# Patient Record
Sex: MALE | Race: WHITE | NOT HISPANIC OR LATINO | Employment: OTHER | URBAN - METROPOLITAN AREA
[De-identification: names, ages, dates, MRNs, and addresses within clinical notes are randomized per-mention and may not be internally consistent; named-entity substitution may affect disease eponyms.]

---

## 2017-02-20 ENCOUNTER — PATIENT OUTREACH (OUTPATIENT)
Dept: ADMINISTRATIVE | Facility: HOSPITAL | Age: 82
End: 2017-02-20

## 2017-02-20 DIAGNOSIS — N40.0 BENIGN NON-NODULAR PROSTATIC HYPERPLASIA, PRESENCE OF LOWER URINARY TRACT SYMPTOMS UNSPECIFIED: Primary | ICD-10-CM

## 2017-02-20 DIAGNOSIS — Z12.5 PROSTATE CANCER SCREENING: ICD-10-CM

## 2017-03-01 ENCOUNTER — LAB VISIT (OUTPATIENT)
Dept: LAB | Facility: HOSPITAL | Age: 82
End: 2017-03-01
Attending: INTERNAL MEDICINE
Payer: MEDICARE

## 2017-03-01 DIAGNOSIS — Z12.5 PROSTATE CANCER SCREENING: ICD-10-CM

## 2017-03-01 DIAGNOSIS — N40.0 BENIGN NON-NODULAR PROSTATIC HYPERPLASIA, PRESENCE OF LOWER URINARY TRACT SYMPTOMS UNSPECIFIED: ICD-10-CM

## 2017-03-01 DIAGNOSIS — I25.810 CORONARY ARTERY DISEASE INVOLVING CORONARY BYPASS GRAFT OF NATIVE HEART WITHOUT ANGINA PECTORIS: ICD-10-CM

## 2017-03-01 LAB
ALBUMIN SERPL BCP-MCNC: 3.7 G/DL
ALP SERPL-CCNC: 73 U/L
ALT SERPL W/O P-5'-P-CCNC: 37 U/L
ANION GAP SERPL CALC-SCNC: 11 MMOL/L
AST SERPL-CCNC: 26 U/L
BASOPHILS # BLD AUTO: 0.01 K/UL
BASOPHILS NFR BLD: 0.2 %
BILIRUB SERPL-MCNC: 0.5 MG/DL
BUN SERPL-MCNC: 23 MG/DL
CALCIUM SERPL-MCNC: 9.5 MG/DL
CHLORIDE SERPL-SCNC: 106 MMOL/L
CHOLEST/HDLC SERPL: 4.5 {RATIO}
CO2 SERPL-SCNC: 21 MMOL/L
COMPLEXED PSA SERPL-MCNC: 0.03 NG/ML
CREAT SERPL-MCNC: 1.1 MG/DL
DIFFERENTIAL METHOD: NORMAL
EOSINOPHIL # BLD AUTO: 0.2 K/UL
EOSINOPHIL NFR BLD: 3.5 %
ERYTHROCYTE [DISTWIDTH] IN BLOOD BY AUTOMATED COUNT: 12.2 %
EST. GFR  (AFRICAN AMERICAN): >60 ML/MIN/1.73 M^2
EST. GFR  (NON AFRICAN AMERICAN): >60 ML/MIN/1.73 M^2
GLUCOSE SERPL-MCNC: 103 MG/DL
HCT VFR BLD AUTO: 42.2 %
HDL/CHOLESTEROL RATIO: 22.2 %
HDLC SERPL-MCNC: 203 MG/DL
HDLC SERPL-MCNC: 45 MG/DL
HGB BLD-MCNC: 14.5 G/DL
LDLC SERPL CALC-MCNC: 105.6 MG/DL
LYMPHOCYTES # BLD AUTO: 1.3 K/UL
LYMPHOCYTES NFR BLD: 31.7 %
MCH RBC QN AUTO: 30.5 PG
MCHC RBC AUTO-ENTMCNC: 34.4 %
MCV RBC AUTO: 89 FL
MONOCYTES # BLD AUTO: 0.5 K/UL
MONOCYTES NFR BLD: 10.9 %
NEUTROPHILS # BLD AUTO: 2.3 K/UL
NEUTROPHILS NFR BLD: 53.5 %
NONHDLC SERPL-MCNC: 158 MG/DL
PLATELET # BLD AUTO: 195 K/UL
PMV BLD AUTO: 11.6 FL
POTASSIUM SERPL-SCNC: 4.2 MMOL/L
PROT SERPL-MCNC: 7.3 G/DL
RBC # BLD AUTO: 4.75 M/UL
SODIUM SERPL-SCNC: 138 MMOL/L
TRIGL SERPL-MCNC: 262 MG/DL
WBC # BLD AUTO: 4.23 K/UL

## 2017-03-01 PROCEDURE — 80053 COMPREHEN METABOLIC PANEL: CPT

## 2017-03-01 PROCEDURE — 84153 ASSAY OF PSA TOTAL: CPT

## 2017-03-01 PROCEDURE — 80061 LIPID PANEL: CPT

## 2017-03-01 PROCEDURE — 85025 COMPLETE CBC W/AUTO DIFF WBC: CPT

## 2017-03-01 PROCEDURE — 36415 COLL VENOUS BLD VENIPUNCTURE: CPT | Mod: PO

## 2017-03-02 ENCOUNTER — OFFICE VISIT (OUTPATIENT)
Dept: INTERNAL MEDICINE | Facility: CLINIC | Age: 82
End: 2017-03-02
Payer: MEDICARE

## 2017-03-02 VITALS
DIASTOLIC BLOOD PRESSURE: 60 MMHG | HEART RATE: 74 BPM | TEMPERATURE: 98 F | BODY MASS INDEX: 28.22 KG/M2 | WEIGHT: 208.31 LBS | HEIGHT: 72 IN | SYSTOLIC BLOOD PRESSURE: 122 MMHG

## 2017-03-02 DIAGNOSIS — I10 ESSENTIAL HYPERTENSION: ICD-10-CM

## 2017-03-02 DIAGNOSIS — E29.1 HYPOGONADISM MALE: ICD-10-CM

## 2017-03-02 DIAGNOSIS — I25.810 CORONARY ARTERY DISEASE INVOLVING CORONARY BYPASS GRAFT OF NATIVE HEART WITHOUT ANGINA PECTORIS: ICD-10-CM

## 2017-03-02 DIAGNOSIS — E78.2 MIXED HYPERLIPIDEMIA: ICD-10-CM

## 2017-03-02 PROCEDURE — 99214 OFFICE O/P EST MOD 30 MIN: CPT | Mod: S$PBB,,, | Performed by: INTERNAL MEDICINE

## 2017-03-02 PROCEDURE — 99214 OFFICE O/P EST MOD 30 MIN: CPT | Mod: PBBFAC,PO | Performed by: INTERNAL MEDICINE

## 2017-03-02 PROCEDURE — 99999 PR PBB SHADOW E&M-EST. PATIENT-LVL IV: CPT | Mod: PBBFAC,,, | Performed by: INTERNAL MEDICINE

## 2017-03-02 RX ORDER — TESTOSTERONE CYPIONATE 200 MG/ML
200 INJECTION, SOLUTION INTRAMUSCULAR
Status: DISCONTINUED | OUTPATIENT
Start: 2017-03-02 | End: 2017-03-02

## 2017-03-02 RX ORDER — TESTOSTERONE CYPIONATE 200 MG/ML
200 INJECTION, SOLUTION INTRAMUSCULAR
Qty: 10 ML | Refills: 0 | Status: SHIPPED | OUTPATIENT
Start: 2017-03-02 | End: 2017-06-07

## 2017-03-02 RX ORDER — SYRINGE, DISPOSABLE, 3 ML
1 SYRINGE, EMPTY DISPOSABLE MISCELLANEOUS
COMMUNITY
End: 2017-03-02 | Stop reason: SDUPTHER

## 2017-03-02 RX ORDER — ASPIRIN 81 MG/1
81 TABLET ORAL DAILY
Refills: 0 | COMMUNITY
Start: 2017-03-02

## 2017-03-02 RX ORDER — TESTOSTERONE CYPIONATE 200 MG/ML
200 INJECTION, SOLUTION INTRAMUSCULAR
Qty: 10 ML | Refills: 0 | Status: SHIPPED | OUTPATIENT
Start: 2017-03-02 | End: 2017-03-02 | Stop reason: SDUPTHER

## 2017-03-02 RX ORDER — SYRINGE, DISPOSABLE, 3 ML
1 SYRINGE, EMPTY DISPOSABLE MISCELLANEOUS
Qty: 25 EACH | Refills: 11 | Status: SHIPPED | OUTPATIENT
Start: 2017-03-02

## 2017-03-02 NOTE — ASSESSMENT & PLAN NOTE
Patient having decreased libido and energy. Has known low testosterone.  Will resume replacement, depotestosterone 200mg once monthly.  Will have to monitor cbc as patient developed polycythemia previously.

## 2017-03-02 NOTE — ASSESSMENT & PLAN NOTE
Cholesterol numbers look good.  We will continue the current regimen at this time.  Remain focused on low fat diet and high dietary fiber intake.

## 2017-03-02 NOTE — MR AVS SNAPSHOT
[facility]  [address] [place] [address]  Phone: [phone]  Fax: [phone]                  Aditya Chua   3/2/2017 9:20 AM   Office Visit    Description:  Male : 1935   Provider:  Fran Walton MD   [facility]  [facility]           Reason for Visit     Follow-up           Diagnoses this Visit        Comments    Coronary artery disease involving coronary bypass graft of native heart without angina pectoris         Essential hypertension         Mixed hyperlipidemia         Hypogonadism male                To Do List           Future Appointments        Provider Department Dept Phone    2017 8:30 AM LABORATORY, [facility] [phone]    2017 8:40 AM Fran Walton MD [facility] [phone]      Goals (5 Years of Data)     None      Follow-Up and Disposition     Return in about 3 months (around 2017).       These Medications        Disp Refills Start End    testosterone cypionate (DEPOTESTOTERONE CYPIONATE) 200 mg/mL injection 10 mL 0 3/2/2017 2017    Inject 1 mL (200 mg total) into the muscle every 28 days. - Intramuscular    Pharmacy: [facility]/pharmacy #[id number] - [place] - [address] AT [facility] #: [phone]         [facility]     [facility] -  Assistance  Registered nurses in the [facility] provide clinical advisement, health education, appointment booking, and other advisory services.  Call for this free service at [phone].             Medications           Message regarding Medications     Verify the changes and/or additions to your medication regime listed below are the same as discussed with your clinician today.  If any of these changes or additions are incorrect, please notify your healthcare provider.        START taking these NEW medications        Refills    testosterone  cypionate (DEPOTESTOTERONE CYPIONATE) 200 mg/mL injection 0    Sig: Inject 1 mL (200 mg total) into the muscle every 28 days.    Class: Print    Route: Intramuscular           Verify that the below list of medications is an accurate representation of the medications you are currently taking.  If none reported, the list may be blank. If incorrect, please contact your healthcare provider. Carry this list with you in case of emergency.           Current Medications     gabapentin (NEURONTIN) 300 MG capsule TAKE 1 CAPSULE BY MOUTH AT BEDTIME FOR 1 WEEK, THEN 2 TABLETS AT BEDTIME IF TOLERATED    losartan (COZAAR) 25 MG tablet Take 1 tablet (25 mg total) by mouth once daily.    naproxen (NAPROSYN) 500 MG tablet Take 1 tablet (500 mg total) by mouth 2 (two) times daily with meals.    omeprazole (PRILOSEC) 20 MG capsule TAKE ONE CAPSULE BY MOUTH EVERY DAY    simvastatin (ZOCOR) 40 MG tablet TAKE 2 TABLEST BY MOUTH ON MONDAY, WEDNESDAY, AND FRIDAY, THEN 1 TABLET ON THE OTHER DAYS    tamsulosin (FLOMAX) 0.4 mg Cp24 TAKE 1 CAPSULE EVERY DAY    tobramycin-dexamethasone 0.3-0.1% (TOBRADEX) 0.3-0.1 % DrpS     triamterene-hydrochlorothiazide 37.5-25 mg (MAXZIDE-25) 37.5-25 mg per tablet TAKE 1 TABLET BY MOUTH EVERY DAY    aspirin (ECOTRIN) 81 MG EC tablet Take 1 tablet (81 mg total) by mouth once daily.    LEVITRA 20 mg tablet TAKE 1 TABLET BY MOUTH 20MINUTES BEFORE SEXUAL ACTIVITY    peg-electrolyte soln 420 gram SolR     PROCTOSOL HC 2.5 % rectal cream     testosterone cypionate (DEPOTESTOTERONE CYPIONATE) 200 mg/mL injection Inject 1 mL (200 mg total) into the muscle every 28 days.           Clinical Reference Information           Your Vitals Were     BP                   122/60           Blood Pressure          Most Recent Value    BP  122/60      Allergies as of 3/2/2017     Ace Inhibitors      Immunizations Administered on Date of Encounter - 3/2/2017     None      Orders Placed During Today's Visit     Future  Labs/Procedures Expected by Expires    CBC auto differential  6/30/2017 3/2/2018      [facility] Sign-Up     Activating your [facility] account is as easy as 1-2-3!     1) Visit [email or web address], select Sign Up Now, enter this activation code and your date of birth, then select Next.  [email or web address]  Expires: 4/16/2017  9:21 AM      2) Create a username and password to use when you visit [facility] in the future and select a security question in case you lose your password and select Next.    3) Enter your e-mail address and click Sign Up!    Additional Information  If you have questions, please e-mail [email or web address] or call [phone] to talk to our [facility] staff. Remember, [facility] is NOT to be used for urgent needs. For medical emergencies, dial 911.         Language Assistance Services     ATTENTION: Language assistance services are available, free of charge. Please call [phone].      ATENCIÓN: Si habla español, tiene a holguin disposición servicios gratuitos de asistencia lingüística. Llame al [phone].     MAYCO Ý: N?u b?n nói Ti?ng Vi?t, có các d?ch v? h? tr? ngôn ng? mi?n phí dành cho b?n. G?i s? [phone].         [facility] complies with applicable Federal civil rights laws and does not discriminate on the basis of race, color, national origin, age, disability, or sex.

## 2017-03-02 NOTE — ASSESSMENT & PLAN NOTE
Blood pressure is under good control.  We will continue the current regimen.  Will work on regular aerobic exercise and a low salt diet.

## 2017-03-02 NOTE — PROGRESS NOTES
Subjective:       Patient ID: Aditya Chua is a 82 y.o. male.    Chief Complaint: Follow-up    HPI  Patient is an 82-year-old male presenting today for follow-up on his coronary disease, hypertension, hyperlipidemia.  He indicates original since he's been doing well.  He is having no major issues.  His cardiac standpoint is he is doing very well.  He is not having chest pain or palpitations.  He continues take his medications as prescribed.  He has noted no signs or symptoms cardiac decompensation.    His blood pressure remains under excellent control.  He takes his medication as prescribed.  There is no adverse effects noted.    Cholesterol numbers also continue to be good he is on appropriate statin therapy.    Patient has noted since coming off his testosterone replacement he has had continued problems with fatigue and lack of libido.  He had done well with his testosterone replacement with the exception of the fact that he was developing some polycythemia associated with it.  For those reasons we had discontinued it.  At this time he would like to go back on the medication and get the benefits of being on it.  We discussed this and we will probably just ahead and do that but wanted to watch his blood counts and we may have to adjust dosing based upon what happens with his blood counts.    Review of Systems   Constitutional: Positive for fatigue. Negative for fever and unexpected weight change.   HENT: Negative for hearing loss, postnasal drip and rhinorrhea.    Eyes: Negative for pain and visual disturbance.   Respiratory: Negative for cough, shortness of breath and wheezing.    Cardiovascular: Negative for chest pain and palpitations.   Gastrointestinal: Negative for constipation, diarrhea, nausea and vomiting.   Genitourinary: Negative for dysuria and hematuria.   Musculoskeletal: Negative for arthralgias, back pain, myalgias and neck stiffness.   Skin: Negative for pallor and rash.   Neurological: Negative  for seizures, syncope and headaches.   Hematological: Negative for adenopathy.   Psychiatric/Behavioral: Negative for dysphoric mood. The patient is not nervous/anxious.        Objective:   /60  Pulse 74  Temp 97.5 °F (36.4 °C) (Tympanic)   Ht 6' (1.829 m)  Wt 94.5 kg (208 lb 5.4 oz)  BMI 28.26 kg/m2     Physical Exam   Constitutional: He is oriented to person, place, and time. He appears well-developed and well-nourished. No distress.   HENT:   Head: Normocephalic and atraumatic.   Mouth/Throat: Oropharynx is clear and moist.   Eyes: EOM are normal. Pupils are equal, round, and reactive to light.   Neck: Normal range of motion. Neck supple. No JVD present. No thyromegaly present.   Cardiovascular: Normal rate, regular rhythm, normal heart sounds and intact distal pulses.  Exam reveals no gallop and no friction rub.    No murmur heard.  Pulmonary/Chest: Effort normal and breath sounds normal. He has no wheezes. He has no rales.   Abdominal: Soft. Bowel sounds are normal. He exhibits no distension. There is no tenderness. There is no rebound and no guarding.   Musculoskeletal: Normal range of motion. He exhibits no edema.   Lymphadenopathy:     He has no cervical adenopathy.   Neurological: He is alert and oriented to person, place, and time. He has normal reflexes. No cranial nerve deficit.   Skin: Skin is warm and dry. No rash noted.   Psychiatric: He has a normal mood and affect. Judgment normal.   Vitals reviewed.      Lab Visit on 03/01/2017   Component Date Value    WBC 03/01/2017 4.23     RBC 03/01/2017 4.75     Hemoglobin 03/01/2017 14.5     Hematocrit 03/01/2017 42.2     MCV 03/01/2017 89     MCH 03/01/2017 30.5     MCHC 03/01/2017 34.4     RDW 03/01/2017 12.2     Platelets 03/01/2017 195     MPV 03/01/2017 11.6     Gran # 03/01/2017 2.3     Lymph # 03/01/2017 1.3     Mono # 03/01/2017 0.5     Eos # 03/01/2017 0.2     Baso # 03/01/2017 0.01     Gran% 03/01/2017 53.5     Lymph%  03/01/2017 31.7     Mono% 03/01/2017 10.9     Eosinophil% 03/01/2017 3.5     Basophil% 03/01/2017 0.2     Differential Method 03/01/2017 Automated     Sodium 03/01/2017 138     Potassium 03/01/2017 4.2     Chloride 03/01/2017 106     CO2 03/01/2017 21*    Glucose 03/01/2017 103     BUN, Bld 03/01/2017 23     Creatinine 03/01/2017 1.1     Calcium 03/01/2017 9.5     Total Protein 03/01/2017 7.3     Albumin 03/01/2017 3.7     Total Bilirubin 03/01/2017 0.5     Alkaline Phosphatase 03/01/2017 73     AST 03/01/2017 26     ALT 03/01/2017 37     Anion Gap 03/01/2017 11     eGFR if African American 03/01/2017 >60.0     eGFR if non  Amer* 03/01/2017 >60.0     Cholesterol 03/01/2017 203*    Triglycerides 03/01/2017 262*    HDL 03/01/2017 45     LDL Cholesterol 03/01/2017 105.6     HDL/Chol Ratio 03/01/2017 22.2     Total Cholesterol/HDL Ra* 03/01/2017 4.5     Non-HDL Cholesterol 03/01/2017 158     PSA, SCREEN 03/01/2017 0.03        Assessment:       1. Coronary artery disease involving coronary bypass graft of native heart without angina pectoris    2. Essential hypertension    3. Mixed hyperlipidemia    4. Hypogonadism male        Plan:   CAD (coronary artery disease)  Stable over time.  No chest pain. Continue taking aspirin and zocor.    Essential hypertension  Blood pressure is under good control.  We will continue the current regimen.  Will work on regular aerobic exercise and a low salt diet.        Hyperlipidemia  Cholesterol numbers look good.  We will continue the current regimen at this time.  Remain focused on low fat diet and high dietary fiber intake.      Hypogonadism male  Patient having decreased libido and energy. Has known low testosterone.  Will resume replacement, depotestosterone 200mg once monthly.  Will have to monitor cbc as patient developed polycythemia previously.    Aditya was seen today for follow-up.    Diagnoses and all orders for this visit:    Coronary artery  "disease involving coronary bypass graft of native heart without angina pectoris    Essential hypertension    Mixed hyperlipidemia    Hypogonadism male  -     Discontinue: testosterone cypionate injection 200 mg; Inject 1 mL (200 mg total) into the muscle every 28 days.  -     CBC auto differential; Future  -     Discontinue: testosterone cypionate (DEPOTESTOTERONE CYPIONATE) 200 mg/mL injection; Inject 1 mL (200 mg total) into the muscle every 28 days.  -     testosterone cypionate (DEPOTESTOTERONE CYPIONATE) 200 mg/mL injection; Inject 1 mL (200 mg total) into the muscle every 28 days.    Other orders  -     syringe, disposable, 3 mL Syrg; 1 Syringe by Misc.(Non-Drug; Combo Route) route every 30 days.  -     needle, disp, 22 G 22 gauge x 1 1/2" Ndle; 1 each by Misc.(Non-Drug; Combo Route) route every 30 days.        Return in about 3 months (around 6/2/2017).  "

## 2017-03-06 ENCOUNTER — TELEPHONE (OUTPATIENT)
Dept: INTERNAL MEDICINE | Facility: CLINIC | Age: 82
End: 2017-03-06

## 2017-03-06 NOTE — TELEPHONE ENCOUNTER
----- Message from Arti Cruz sent at 3/6/2017  9:31 AM CST -----  Contact: Wife/ Martha Thomas is requesting to speak with nurse regarding questions about pt visit from 3/2/17. Pls call Martha back at [phone]

## 2017-04-10 RX ORDER — OMEPRAZOLE 20 MG/1
20 CAPSULE, DELAYED RELEASE ORAL DAILY
Qty: 90 CAPSULE | Refills: 3 | Status: SHIPPED | OUTPATIENT
Start: 2017-04-10 | End: 2018-05-07 | Stop reason: SDUPTHER

## 2017-04-10 NOTE — TELEPHONE ENCOUNTER
----- Message from Moni Oviedo sent at 4/10/2017  9:17 AM CDT -----  Contact: Pat - wife  She states that [facility] [phone] told her that they have not gotten a response from the doctor's office regarding his refill for Omeprazole.   Call her at [phone].                                      vargas

## 2017-04-13 RX ORDER — GABAPENTIN 300 MG/1
CAPSULE ORAL
Qty: 60 CAPSULE | Refills: 11 | Status: SHIPPED | OUTPATIENT
Start: 2017-04-13 | End: 2018-05-12 | Stop reason: SDUPTHER

## 2017-05-25 ENCOUNTER — LAB VISIT (OUTPATIENT)
Dept: LAB | Facility: HOSPITAL | Age: 82
End: 2017-05-25
Attending: INTERNAL MEDICINE
Payer: MEDICARE

## 2017-05-25 DIAGNOSIS — E29.1 HYPOGONADISM MALE: ICD-10-CM

## 2017-05-25 LAB
BASOPHILS # BLD AUTO: 0.02 K/UL
BASOPHILS NFR BLD: 0.4 %
DIFFERENTIAL METHOD: NORMAL
EOSINOPHIL # BLD AUTO: 0.1 K/UL
EOSINOPHIL NFR BLD: 2 %
ERYTHROCYTE [DISTWIDTH] IN BLOOD BY AUTOMATED COUNT: 13.2 %
HCT VFR BLD AUTO: 49.7 %
HGB BLD-MCNC: 16.4 G/DL
LYMPHOCYTES # BLD AUTO: 1.4 K/UL
LYMPHOCYTES NFR BLD: 26.1 %
MCH RBC QN AUTO: 30.7 PG
MCHC RBC AUTO-ENTMCNC: 33 %
MCV RBC AUTO: 93 FL
MONOCYTES # BLD AUTO: 0.5 K/UL
MONOCYTES NFR BLD: 9.1 %
NEUTROPHILS # BLD AUTO: 3.4 K/UL
NEUTROPHILS NFR BLD: 62.2 %
PLATELET # BLD AUTO: 194 K/UL
PMV BLD AUTO: 11.8 FL
RBC # BLD AUTO: 5.34 M/UL
WBC # BLD AUTO: 5.48 K/UL

## 2017-05-25 PROCEDURE — 36415 COLL VENOUS BLD VENIPUNCTURE: CPT | Mod: PO

## 2017-05-25 PROCEDURE — 85025 COMPLETE CBC W/AUTO DIFF WBC: CPT

## 2017-06-02 ENCOUNTER — LAB VISIT (OUTPATIENT)
Dept: LAB | Facility: HOSPITAL | Age: 82
End: 2017-06-02
Attending: INTERNAL MEDICINE
Payer: MEDICARE

## 2017-06-02 ENCOUNTER — OFFICE VISIT (OUTPATIENT)
Dept: INTERNAL MEDICINE | Facility: CLINIC | Age: 82
End: 2017-06-02
Payer: MEDICARE

## 2017-06-02 VITALS
HEIGHT: 72 IN | SYSTOLIC BLOOD PRESSURE: 120 MMHG | HEART RATE: 72 BPM | BODY MASS INDEX: 28.07 KG/M2 | DIASTOLIC BLOOD PRESSURE: 60 MMHG | WEIGHT: 207.25 LBS | TEMPERATURE: 98 F

## 2017-06-02 DIAGNOSIS — R53.1 WEAKNESS: ICD-10-CM

## 2017-06-02 DIAGNOSIS — I25.810 CORONARY ARTERY DISEASE INVOLVING CORONARY BYPASS GRAFT OF NATIVE HEART WITHOUT ANGINA PECTORIS: ICD-10-CM

## 2017-06-02 DIAGNOSIS — I10 ESSENTIAL HYPERTENSION: ICD-10-CM

## 2017-06-02 DIAGNOSIS — R53.1 WEAKNESS: Primary | ICD-10-CM

## 2017-06-02 LAB
ALBUMIN SERPL BCP-MCNC: 3.9 G/DL
ALP SERPL-CCNC: 70 U/L
ALT SERPL W/O P-5'-P-CCNC: 27 U/L
ANION GAP SERPL CALC-SCNC: 11 MMOL/L
AST SERPL-CCNC: 23 U/L
BASOPHILS # BLD AUTO: 0.02 K/UL
BASOPHILS NFR BLD: 0.3 %
BILIRUB SERPL-MCNC: 0.6 MG/DL
BILIRUB UR QL STRIP: NEGATIVE
BUN SERPL-MCNC: 27 MG/DL
CALCIUM SERPL-MCNC: 9.9 MG/DL
CHLORIDE SERPL-SCNC: 103 MMOL/L
CLARITY UR REFRACT.AUTO: ABNORMAL
CO2 SERPL-SCNC: 22 MMOL/L
COLOR UR AUTO: YELLOW
CREAT SERPL-MCNC: 1.3 MG/DL
DIFFERENTIAL METHOD: NORMAL
EOSINOPHIL # BLD AUTO: 0.1 K/UL
EOSINOPHIL NFR BLD: 1.8 %
ERYTHROCYTE [DISTWIDTH] IN BLOOD BY AUTOMATED COUNT: 12.9 %
EST. GFR  (AFRICAN AMERICAN): 59.6 ML/MIN/1.73 M^2
EST. GFR  (NON AFRICAN AMERICAN): 51.5 ML/MIN/1.73 M^2
GLUCOSE SERPL-MCNC: 109 MG/DL
GLUCOSE UR QL STRIP: NEGATIVE
HCT VFR BLD AUTO: 50.3 %
HGB BLD-MCNC: 16.9 G/DL
HGB UR QL STRIP: NEGATIVE
KETONES UR QL STRIP: NEGATIVE
LEUKOCYTE ESTERASE UR QL STRIP: NEGATIVE
LYMPHOCYTES # BLD AUTO: 1.6 K/UL
LYMPHOCYTES NFR BLD: 22.9 %
MCH RBC QN AUTO: 31 PG
MCHC RBC AUTO-ENTMCNC: 33.6 %
MCV RBC AUTO: 92 FL
MONOCYTES # BLD AUTO: 0.5 K/UL
MONOCYTES NFR BLD: 6.5 %
NEUTROPHILS # BLD AUTO: 4.8 K/UL
NEUTROPHILS NFR BLD: 68.4 %
NITRITE UR QL STRIP: NEGATIVE
PH UR STRIP: 6 [PH] (ref 5–8)
PLATELET # BLD AUTO: 222 K/UL
PMV BLD AUTO: 11.9 FL
POTASSIUM SERPL-SCNC: 4 MMOL/L
PROT SERPL-MCNC: 8 G/DL
PROT UR QL STRIP: NEGATIVE
RBC # BLD AUTO: 5.46 M/UL
SODIUM SERPL-SCNC: 136 MMOL/L
SP GR UR STRIP: 1.01 (ref 1–1.03)
TSH SERPL DL<=0.005 MIU/L-ACNC: 1.17 UIU/ML
URN SPEC COLLECT METH UR: ABNORMAL
UROBILINOGEN UR STRIP-ACNC: NEGATIVE EU/DL
WBC # BLD AUTO: 7.03 K/UL

## 2017-06-02 PROCEDURE — 85025 COMPLETE CBC W/AUTO DIFF WBC: CPT

## 2017-06-02 PROCEDURE — 84443 ASSAY THYROID STIM HORMONE: CPT

## 2017-06-02 PROCEDURE — 36415 COLL VENOUS BLD VENIPUNCTURE: CPT | Mod: PO

## 2017-06-02 PROCEDURE — 99214 OFFICE O/P EST MOD 30 MIN: CPT | Mod: S$PBB,,, | Performed by: INTERNAL MEDICINE

## 2017-06-02 PROCEDURE — 80053 COMPREHEN METABOLIC PANEL: CPT

## 2017-06-02 PROCEDURE — 86747 PARVOVIRUS ANTIBODY: CPT | Mod: 91

## 2017-06-02 PROCEDURE — 86665 EPSTEIN-BARR CAPSID VCA: CPT

## 2017-06-02 PROCEDURE — 99999 PR PBB SHADOW E&M-EST. PATIENT-LVL III: CPT | Mod: PBBFAC,,, | Performed by: INTERNAL MEDICINE

## 2017-06-02 NOTE — PROGRESS NOTES
Subjective:       Patient ID: Aditya Chua is a 82 y.o. male.    Chief Complaint: Follow-up (dizzy, lightheaded)    HPI  patient is 82-year-old male presenting today with concerns of feeling poorly.  He states the last 3 weeks he is just not felt well.  He states about 3 weeks ago he awakened one day and was started having issues with feeling lightheaded.  He states he's feeling get up out of the chair getting up out of bed he would feel lightheaded and dizzy.  He fell a couple of times when this occurred.  He did not have any vertigo.  He describes no symptoms of the earth moving or tilting.  It was purely a lightheaded sensation.  He did not associate any other symptoms with it.  He noted no fever no chills.  No chest pain and palpitations.  No nausea or vomiting.  No diarrhea.  No dysuria no hematuria.  No urinary frequency.  Over the course of about 3-4 days he symptoms of lightheadedness got better but he is continued to feel fatigued and worn out sounds.  He states he just doesn't feel like himself.  He normally plays a lot of golf and he has not felt like playing golf.  The last time he tried to play golf he only played 9 holes because he felt tired and worn out.  He does not describe any cardiac symptoms of chest pain or palpitations.  He does not describe shortness of breath.  He more specifically just feels bad.  He states he has no energy and just doesn't feel like himself.  He had lab work done a week or so ago to assess his blood counts.  He had a history of polycythemia associated with testosterone therapy and we had put him back on testosterone therapy for a brief episode to see what would happen with his numbers and it does look like his numbers went back up again with a H&H of 16 and 49.    Review of Systems   Constitutional: Positive for fatigue. Negative for fever and unexpected weight change.   HENT: Negative for hearing loss, postnasal drip and rhinorrhea.    Eyes: Negative for pain and visual  disturbance.   Respiratory: Negative for cough, shortness of breath and wheezing.    Cardiovascular: Negative for chest pain and palpitations.   Gastrointestinal: Negative for constipation, diarrhea, nausea and vomiting.   Genitourinary: Negative for dysuria and hematuria.   Musculoskeletal: Negative for arthralgias, back pain, myalgias and neck stiffness.   Skin: Negative for pallor and rash.   Neurological: Positive for weakness. Negative for seizures, syncope and headaches.   Hematological: Negative for adenopathy.   Psychiatric/Behavioral: Negative for dysphoric mood. The patient is not nervous/anxious.        Objective:   /60   Pulse 72   Temp 97.6 °F (36.4 °C) (Tympanic)   Ht 6' (1.829 m)   Wt 94 kg (207 lb 3.7 oz)   BMI 28.11 kg/m²      Physical Exam   Constitutional: He is oriented to person, place, and time. He appears well-developed and well-nourished. No distress.   HENT:   Head: Normocephalic and atraumatic.   Mouth/Throat: Oropharynx is clear and moist.   Eyes: EOM are normal. Pupils are equal, round, and reactive to light.   Neck: Normal range of motion. Neck supple. No JVD present. No thyromegaly present.   Cardiovascular: Normal rate, regular rhythm, normal heart sounds and intact distal pulses.  Exam reveals no gallop and no friction rub.    No murmur heard.  Pulmonary/Chest: Effort normal and breath sounds normal. He has no wheezes. He has no rales.   Abdominal: Soft. Bowel sounds are normal. He exhibits no distension. There is no tenderness. There is no rebound and no guarding.   Musculoskeletal: Normal range of motion. He exhibits no edema.   Lymphadenopathy:     He has no cervical adenopathy.   Neurological: He is alert and oriented to person, place, and time. He has normal reflexes. He exhibits normal muscle tone. Coordination normal.   Skin: Skin is warm and dry. No rash noted.   Psychiatric: He has a normal mood and affect. Judgment normal.   Vitals reviewed.      Lab Visit on  05/25/2017   Component Date Value    WBC 05/25/2017 5.48     RBC 05/25/2017 5.34     Hemoglobin 05/25/2017 16.4     Hematocrit 05/25/2017 49.7     MCV 05/25/2017 93     MCH 05/25/2017 30.7     MCHC 05/25/2017 33.0     RDW 05/25/2017 13.2     Platelets 05/25/2017 194     MPV 05/25/2017 11.8     Gran # 05/25/2017 3.4     Lymph # 05/25/2017 1.4     Mono # 05/25/2017 0.5     Eos # 05/25/2017 0.1     Baso # 05/25/2017 0.02     Gran% 05/25/2017 62.2     Lymph% 05/25/2017 26.1     Mono% 05/25/2017 9.1     Eosinophil% 05/25/2017 2.0     Basophil% 05/25/2017 0.4     Differential Method 05/25/2017 Automated        Assessment:       1. Weakness    2. Coronary artery disease involving coronary bypass graft of native heart without angina pectoris    3. Essential hypertension        Plan:   CAD (coronary artery disease)  No chest pain.  No cardiac decompensation.    Essential hypertension  Blood pressure is under good control.  We will continue the current regimen.  Will work on regular aerobic exercise and a low salt diet.        Aditya was seen today for follow-up.    Diagnoses and all orders for this visit:    Weakness  Comments:  Fatigue and weakness. 3 day episode of lightheadedness and dizziness, resolved.  Labs.  Orders:  -     CBC auto differential; Future  -     Comprehensive metabolic panel; Future  -     TSH; Future  -     Urinalysis  -     Pablito-Barr Virus antibody panel; Future  -     PARVOVIRUS B19 ANTIBODY, IGG AND IGM; Future    Coronary artery disease involving coronary bypass graft of native heart without angina pectoris    Essential hypertension     unclear etiology to his symptoms at this time.  There is some aspect the next sound like it was a viral illness of some sort.  He had the sudden issues of feeling poorly and being lightheaded and dizzy and subsequently has had persistent fatigue.  His exam is unremarkable.  He does not relate any cardiac symptoms or pulmonary symptoms no GI  symptoms which would correlate.  His wife did indicate that his blood pressure seems be running a little bit low in the 3 days that he was having the dizziness but it has stabilized since.  We will initiate some workup with lab work today.  We'll check for kidney and liver issues thyroid issues urinalysis for possible underlying infection we will check for Pablito-Roblero parvo for odd issues.  We'll see him back in follow-up in a week.  If he develops any new symptoms or changes in symptoms he will notify us.    Return in about 10 days (around 6/12/2017).

## 2017-06-05 ENCOUNTER — TELEPHONE (OUTPATIENT)
Dept: INTERNAL MEDICINE | Facility: CLINIC | Age: 82
End: 2017-06-05

## 2017-06-05 RX ORDER — MECLIZINE HYDROCHLORIDE 25 MG/1
25 TABLET ORAL 3 TIMES DAILY PRN
Qty: 30 TABLET | Refills: 1 | Status: SHIPPED | OUTPATIENT
Start: 2017-06-05 | End: 2017-06-15

## 2017-06-05 NOTE — TELEPHONE ENCOUNTER
----- Message from Carmen Hall sent at 6/5/2017  8:44 AM CDT -----  Contact: wife  States the pt is returning a missed call, pt can be reached at [phone]///[id number]

## 2017-06-06 LAB
PARVOVIRUS B19 ABS IGG & IGM: ABNORMAL
PARVOVIRUS B19 IGG ANTIBODY: 3.82 INDEX
PARVOVIRUS B19 IGM ANTIBODY: 0.1 INDEX

## 2017-06-06 RX ORDER — MELOXICAM 15 MG/1
15 TABLET ORAL DAILY
Qty: 30 TABLET | Refills: 11 | Status: SHIPPED | OUTPATIENT
Start: 2017-06-06 | End: 2017-06-07

## 2017-06-06 NOTE — TELEPHONE ENCOUNTER
Patient stated he is still taking his meloxicam.  It is not longer in the medcard because he told a nurse at one point he was no longer taking.  Can you please sent to pharmacy.  Thank you.

## 2017-06-06 NOTE — TELEPHONE ENCOUNTER
----- Message from Moni Oviedo sent at 6/6/2017  3:10 PM CDT -----  Contact: [facility]  Patient needs a refill on Meloxican 15 mgs and she had requested earlier, but has gotten no response.   Call her at [phone].                                          vargas

## 2017-06-07 ENCOUNTER — TELEPHONE (OUTPATIENT)
Dept: INTERNAL MEDICINE | Facility: CLINIC | Age: 82
End: 2017-06-07

## 2017-06-07 ENCOUNTER — HOSPITAL ENCOUNTER (EMERGENCY)
Facility: HOSPITAL | Age: 82
Discharge: STILL ADMITTED OR ADMINISTRATIVE | End: 2017-06-07
Attending: EMERGENCY MEDICINE
Payer: MEDICARE

## 2017-06-07 VITALS
HEART RATE: 62 BPM | HEIGHT: 72 IN | TEMPERATURE: 98 F | SYSTOLIC BLOOD PRESSURE: 139 MMHG | OXYGEN SATURATION: 97 % | DIASTOLIC BLOOD PRESSURE: 93 MMHG | RESPIRATION RATE: 16 BRPM | BODY MASS INDEX: 27.36 KG/M2 | WEIGHT: 202 LBS

## 2017-06-07 DIAGNOSIS — R90.89 MIDLINE SHIFT OF BRAIN: ICD-10-CM

## 2017-06-07 DIAGNOSIS — G93.89 BRAIN MASS: Primary | ICD-10-CM

## 2017-06-07 DIAGNOSIS — R27.0 ATAXIA: ICD-10-CM

## 2017-06-07 LAB
ALBUMIN SERPL BCP-MCNC: 3.8 G/DL
ALP SERPL-CCNC: 66 U/L
ALT SERPL W/O P-5'-P-CCNC: 29 U/L
ANION GAP SERPL CALC-SCNC: 9 MMOL/L
AST SERPL-CCNC: 22 U/L
BASOPHILS # BLD AUTO: 0.02 K/UL
BASOPHILS NFR BLD: 0.3 %
BILIRUB SERPL-MCNC: 0.5 MG/DL
BILIRUB UR QL STRIP: NEGATIVE
BNP SERPL-MCNC: 32 PG/ML
BUN SERPL-MCNC: 20 MG/DL
CALCIUM SERPL-MCNC: 9.5 MG/DL
CHLORIDE SERPL-SCNC: 104 MMOL/L
CK SERPL-CCNC: 80 U/L
CLARITY UR: CLEAR
CO2 SERPL-SCNC: 25 MMOL/L
COLOR UR: YELLOW
CREAT SERPL-MCNC: 1.1 MG/DL
DIFFERENTIAL METHOD: NORMAL
EBV EA IGG SER QL IF: ABNORMAL TITER
EBV NA IGG SER IA-ACNC: ABNORMAL TITER
EBV VCA IGG SER IA-ACNC: ABNORMAL TITER
EBV VCA IGM SER IA-ACNC: ABNORMAL TITER
EOSINOPHIL # BLD AUTO: 0.1 K/UL
EOSINOPHIL NFR BLD: 2.1 %
ERYTHROCYTE [DISTWIDTH] IN BLOOD BY AUTOMATED COUNT: 12.8 %
EST. GFR  (AFRICAN AMERICAN): >60 ML/MIN/1.73 M^2
EST. GFR  (NON AFRICAN AMERICAN): >60 ML/MIN/1.73 M^2
GLUCOSE SERPL-MCNC: 114 MG/DL
GLUCOSE UR QL STRIP: NEGATIVE
HCT VFR BLD AUTO: 47.3 %
HGB BLD-MCNC: 16.4 G/DL
HGB UR QL STRIP: NEGATIVE
KETONES UR QL STRIP: NEGATIVE
LEUKOCYTE ESTERASE UR QL STRIP: NEGATIVE
LYMPHOCYTES # BLD AUTO: 1.9 K/UL
LYMPHOCYTES NFR BLD: 30.7 %
MCH RBC QN AUTO: 31 PG
MCHC RBC AUTO-ENTMCNC: 34.7 %
MCV RBC AUTO: 89 FL
MONOCYTES # BLD AUTO: 0.5 K/UL
MONOCYTES NFR BLD: 7.6 %
NEUTROPHILS # BLD AUTO: 3.6 K/UL
NEUTROPHILS NFR BLD: 59.3 %
NITRITE UR QL STRIP: NEGATIVE
PH UR STRIP: 7 [PH] (ref 5–8)
PLATELET # BLD AUTO: 209 K/UL
PMV BLD AUTO: 11.2 FL
POTASSIUM SERPL-SCNC: 4.3 MMOL/L
PROT SERPL-MCNC: 7.8 G/DL
PROT UR QL STRIP: NEGATIVE
RBC # BLD AUTO: 5.29 M/UL
SODIUM SERPL-SCNC: 138 MMOL/L
SP GR UR STRIP: 1.01 (ref 1–1.03)
TROPONIN I SERPL DL<=0.01 NG/ML-MCNC: 0.02 NG/ML
URN SPEC COLLECT METH UR: NORMAL
UROBILINOGEN UR STRIP-ACNC: NEGATIVE EU/DL
WBC # BLD AUTO: 6.06 K/UL

## 2017-06-07 PROCEDURE — 84484 ASSAY OF TROPONIN QUANT: CPT

## 2017-06-07 PROCEDURE — 83880 ASSAY OF NATRIURETIC PEPTIDE: CPT

## 2017-06-07 PROCEDURE — 85025 COMPLETE CBC W/AUTO DIFF WBC: CPT

## 2017-06-07 PROCEDURE — 80053 COMPREHEN METABOLIC PANEL: CPT

## 2017-06-07 PROCEDURE — 81003 URINALYSIS AUTO W/O SCOPE: CPT

## 2017-06-07 PROCEDURE — 93005 ELECTROCARDIOGRAM TRACING: CPT

## 2017-06-07 PROCEDURE — 99285 EMERGENCY DEPT VISIT HI MDM: CPT | Mod: 25

## 2017-06-07 PROCEDURE — 82550 ASSAY OF CK (CPK): CPT

## 2017-06-07 PROCEDURE — 93010 ELECTROCARDIOGRAM REPORT: CPT | Mod: ,,, | Performed by: INTERNAL MEDICINE

## 2017-06-07 NOTE — TELEPHONE ENCOUNTER
----- Message from Jael Israel sent at 6/7/2017  7:46 AM CDT -----  Contact: wife/Octavia Chua  States he fell out of the bed again last night and he can't hardly walk. States it took him and her 10 minutes to get him off the floor. Please call Octavia Chua at [phone]. Thank you

## 2017-06-07 NOTE — ED PROVIDER NOTES
SCRIBE #1 NOTE: I, Cuate Le, am scribing for, and in the presence of, Naif Sofia MD. I have scribed the entire note.      History      Chief Complaint   Patient presents with    Extremity Weakness     Patient states he is having freq falls and dizziness, bilateral arm and bilateral leg weakness, Dr. Walton referred to ED for further eval        Review of patient's allergies indicates:   Allergen Reactions    Ace inhibitors Other (See Comments)     cough        HPI   HPI    6/7/2017, 11:03 AM   History obtained from the wife and patient      History of Present Illness: Aditya Chua is a 82 y.o. male patient who presents to the Emergency Department for dizziness which onset gradually 3 weeks ago. Symptoms are constant and moderate in severity. Pt reports he was evaluated by his PCP, Dr. Walton for vertigo 5 days ago, who prescribed him Antivert. States sxs are not resolved and he fell out of his bed this morning. States he has been frequently falling over the past few weeks. No mitigating or exacerbating factors reported. Associated sxs include generalized weakness. Patient denies any fever, chills, HA, visual disturbance, CP, SOB, N/V/D, dysuria, numbness, and all other sxs at this time. No further complaints or concerns at this time.       Arrival mode: Personal vehicle     PCP: Fran Walton MD       Past Medical History:  Past Medical History:   Diagnosis Date    BPH (benign prostatic hyperplasia)     CAD (coronary artery disease)     Hyperlipidemia     Hypertension     Hypogonadism male     Osteoarthritis        Past Surgical History:  Past Surgical History:   Procedure Laterality Date    APPENDECTOMY      ladi. cataract surgery      bilateral carpal tunnel      CARDIAC SURGERY      COLONOSCOPY      CORONARY ARTERY BYPASS GRAFT      over 15 years ago    EYE SURGERY      HERNIA REPAIR      JOINT REPLACEMENT      left hip    left hip replacement           Family History:  Family  History   Problem Relation Age of Onset    Aneurysm Mother     Kidney disease Father     Colon cancer Neg Hx        Social History:  Social History     Social History Main Topics    Smoking status: Former Smoker     Packs/day: 2.00     Years: 10.00     Quit date: 2/26/1959    Smokeless tobacco: Never Used    Alcohol use No    Drug use: No    Sexual activity: Yes     Partners: Female       ROS   Review of Systems   Constitutional: Negative for chills and fever.        (+) generalized weakness   HENT: Negative for sore throat.    Eyes: Negative for visual disturbance.   Respiratory: Negative for shortness of breath.    Cardiovascular: Negative for chest pain.   Gastrointestinal: Negative for diarrhea, nausea and vomiting.   Genitourinary: Negative for dysuria.   Musculoskeletal: Negative for back pain.   Skin: Negative for rash.   Neurological: Positive for dizziness. Negative for numbness and headaches.   Hematological: Does not bruise/bleed easily.   All other systems reviewed and are negative.    Physical Exam      Initial Vitals [06/07/17 1055]   BP Pulse Resp Temp SpO2   (!) 141/69 61 18 98.3 °F (36.8 °C) (!) 94 %      Physical Exam  Nursing Notes and Vital Signs Reviewed.  Constitutional: Patient is in no acute distress. Well-developed and well-nourished. Elderly  Head: Atraumatic. Normocephalic.  Eyes: PERRL. EOM intact. Conjunctivae are not pale. No scleral icterus.  ENT: Mucous membranes are moist. Oropharynx is clear and symmetric.    Neck: Supple. Full ROM. No lymphadenopathy.  Cardiovascular: Regular rate. Regular rhythm. No murmurs, rubs, or gallops. Distal pulses are 2+ and symmetric.  Pulmonary/Chest: No respiratory distress. Clear to auscultation bilaterally. No wheezing, rales, or rhonchi.  Abdominal: Soft and non-distended.  There is no tenderness.  No rebound, guarding, or rigidity. Good bowel sounds.  Musculoskeletal: Moves all extremities. No obvious deformities. No edema. No calf  tenderness.  Skin: Warm and dry.  Neurological:  Alert, awake, and appropriate. Patient is alert and oriented to person, place and time. Cranial nerves II-XII are intact. Normal speech. Gait ataxia.   Psychiatric: Normal affect. Good eye contact. Appropriate in content.    ED Course    Procedures  ED Vital Signs:  Vitals:    06/07/17 1055 06/07/17 1153 06/07/17 1228 06/07/17 1230   BP: (!) 141/69  (!) 143/68 (!) 151/81   Pulse: 61 60 66 60   Resp: 18      Temp: 98.3 °F (36.8 °C)      TempSrc: Oral      SpO2: (!) 94%  95% (!) 94%   Weight: 91.6 kg (202 lb)      Height: 6' (1.829 m)       06/07/17 1232 06/07/17 1249 06/07/17 1347   BP: (!) 162/65 (!) 154/84 (!) 139/93   Pulse: 61 61 62   Resp:  (!) 8 16   Temp:      TempSrc:      SpO2: (!) 94% 98% 97%   Weight:      Height:          Abnormal Lab Results:  Labs Reviewed   COMPREHENSIVE METABOLIC PANEL - Abnormal; Notable for the following:        Result Value    Glucose 114 (*)     All other components within normal limits   CBC W/ AUTO DIFFERENTIAL   URINALYSIS   B-TYPE NATRIURETIC PEPTIDE   CK   TROPONIN I        All Lab Results:  Results for orders placed or performed during the hospital encounter of 06/07/17   CBC auto differential   Result Value Ref Range    WBC 6.06 3.90 - 12.70 K/uL    RBC 5.29 4.60 - 6.20 M/uL    Hemoglobin 16.4 14.0 - 18.0 g/dL    Hematocrit 47.3 40.0 - 54.0 %    MCV 89 82 - 98 fL    MCH 31.0 27.0 - 31.0 pg    MCHC 34.7 32.0 - 36.0 %    RDW 12.8 11.5 - 14.5 %    Platelets 209 150 - 350 K/uL    MPV 11.2 9.2 - 12.9 fL    Gran # 3.6 1.8 - 7.7 K/uL    Lymph # 1.9 1.0 - 4.8 K/uL    Mono # 0.5 0.3 - 1.0 K/uL    Eos # 0.1 0.0 - 0.5 K/uL    Baso # 0.02 0.00 - 0.20 K/uL    Gran% 59.3 38.0 - 73.0 %    Lymph% 30.7 18.0 - 48.0 %    Mono% 7.6 4.0 - 15.0 %    Eosinophil% 2.1 0.0 - 8.0 %    Basophil% 0.3 0.0 - 1.9 %    Differential Method Automated    Comprehensive metabolic panel   Result Value Ref Range    Sodium 138 136 - 145 mmol/L    Potassium 4.3 3.5 -  5.1 mmol/L    Chloride 104 95 - 110 mmol/L    CO2 25 23 - 29 mmol/L    Glucose 114 (H) 70 - 110 mg/dL    BUN, Bld 20 8 - 23 mg/dL    Creatinine 1.1 0.5 - 1.4 mg/dL    Calcium 9.5 8.7 - 10.5 mg/dL    Total Protein 7.8 6.0 - 8.4 g/dL    Albumin 3.8 3.5 - 5.2 g/dL    Total Bilirubin 0.5 0.1 - 1.0 mg/dL    Alkaline Phosphatase 66 55 - 135 U/L    AST 22 10 - 40 U/L    ALT 29 10 - 44 U/L    Anion Gap 9 8 - 16 mmol/L    eGFR if African American >60 >60 mL/min/1.73 m^2    eGFR if non African American >60 >60 mL/min/1.73 m^2   Urinalysis   Result Value Ref Range    Specimen UA Urine, Clean Catch     Color, UA Yellow Yellow, Straw, Teetee    Appearance, UA Clear Clear    pH, UA 7.0 5.0 - 8.0    Specific Gravity, UA 1.010 1.005 - 1.030    Protein, UA Negative Negative    Glucose, UA Negative Negative    Ketones, UA Negative Negative    Bilirubin (UA) Negative Negative    Occult Blood UA Negative Negative    Nitrite, UA Negative Negative    Urobilinogen, UA Negative <2.0 EU/dL    Leukocytes, UA Negative Negative   Brain natriuretic peptide   Result Value Ref Range    BNP 32 0 - 99 pg/mL   CK   Result Value Ref Range    CPK 80 20 - 200 U/L   Troponin I   Result Value Ref Range    Troponin I 0.017 0.000 - 0.026 ng/mL         Imaging Results:  Imaging Results          CT Head Without Contrast (Final result)  Result time 06/07/17 12:26:22    Final result by Faizan Mehta Jr., MD (06/07/17 12:26:22)                 Impression:     Large area of abnormal low density within the right cerebral hemisphere with regional mass effect, sulcal effacement, and right-to-left midline shift of 6 mm.  Findings are concerning for primary brain tumor such as GBM or potentially a secondary malignancy.    All CT scans at this facility use dose modulation, iterative reconstruction, and/or weight base dosing when appropriate to reduce radiation dose to as low as reasonably achievable.       Electronically signed by: FAIZAN MEHTA M.D.  Date:      06/07/17  Time:    12:26              Narrative:    EXAM: [id number] HEAD WITHOUT CONTRAST    CLINICAL HISTORY: Dizziness, extremity weakness.    TECHNIQUE: Contiguous axial images were obtained from the skull base through the vertex without intravenous contrast.    COMPARISON: There are no available comparison studies.    FINDINGS: No intracranial hemorrhage. There is extensive, abnormal low density involving the white matter of the right cerebral hemisphere with pronounced mass effect and right-to-left midline shift of about 6 mm.  There is sulcal effacement on the right with mild transfalcine herniation.  The abnormality appears to be centered within the right frontotemporal region. No sign of impending uncal herniation. No extra axial fluid collections.  There is mass effect upon the right lateral ventricle which is nearly completely effaced.  The left lateral ventricle is more normal in appearance. No evidence of hydrocephalus. The pineal region is unremarkable. The posterior fossa structures are grossly unremarkable within the limits of CT scan. The paranasal sinuses and mastoid air cells are clear. No fractures are identified. No concerning osseous lesions.                             X-Ray Chest AP Portable (Final result)  Result time 06/07/17 11:12:49    Final result by Saul Fields III, MD (06/07/17 11:12:49)                 Impression:     No acute cardiopulmonary abnormality suggested.      Electronically signed by: SAUL FIELDS MD  Date:     06/07/17  Time:    11:12              Narrative:    One view chest x-ray.    Clinical indication: Weakness.    Heart size is normal with postoperative changes along the sternum.  Mild tortuosity of the thoracic aorta with calcification along the arch.  The patient is rotated.  The lung fields are otherwise clear with chronic changes suggested.. No acute pulmonary infiltrate.                             The EKG was ordered, reviewed, and independently  interpreted by the ED provider.  Interpretation time: 1116  Rate: 58 BPM  Rhythm: sinus bradycardia  Interpretation: LVH with QRS widening. No STEMI.         The Emergency Provider reviewed the vital signs and test results, which are outlined above.    ED Discussion     12:45 PM: Consult with house supervisor at [facility] concerning pt. There are no neurosurgeru services, which the patient requires, offered at [facility] at this time. Dr. Duran expresses understanding and will accept transfer for neurosurgery services.  Accepting Facility: [facility] ED  Accepting Physician: Dr. Duran    12:54 PM: Re-evaluated pt. Informed pt and family that there are no neurosurgery services available at this time. I have discussed test results, shared treatment plan, and the need for transfer with patient and family at bedside. All historical, clinical, radiographic, and laboratory findings were reviewed with the patient/family in detail. Patient will be transferred by [facility] with BLS care required en route. Patient understands that there could be unforeseen motor vehicle accidents or loss of vital signs that could result in potential death or permanent disability. Pt and family express understanding at this time and agree with all information. All questions answered. Pt and family have no further questions or concerns at this time. Pt is ready for transfer.       ED Medication(s):  Medications - No data to display    New Prescriptions    No medications on file             Medical Decision Making    Medical Decision Making:   Clinical Tests:   Lab Tests: Ordered and Reviewed  Radiological Study: Ordered and Reviewed  Medical Tests: Ordered and Reviewed           Scribe Attestation:   Scribe #1: I performed the above scribed service and the documentation accurately describes the services I performed. I attest to the accuracy of the note.    Attending:   Physician Attestation Statement for Scribe #1: I, Naif Sofia MD,  personally performed the services described in this documentation, as scribed by Cuate Le, in my presence, and it is both accurate and complete.          Clinical Impression       ICD-10-CM ICD-9-CM   1. Brain mass G93.9 348.9   2. Midline shift of brain G93.9 348.9   3. Ataxia R27.0 781.3       Disposition:   Disposition: Transferred (to [facility] ED)  Condition: Stable         Naif Sofia MD  06/07/17 5559

## 2017-06-07 NOTE — TELEPHONE ENCOUNTER
Wife stated patient is like lethargic and can not get out of bed without falling due to everything spinning so bad.  Patient was informed to go to the hospital at [facility].  They are heading over there this morning.

## 2017-08-21 ENCOUNTER — TELEPHONE (OUTPATIENT)
Dept: INTERNAL MEDICINE | Facility: CLINIC | Age: 82
End: 2017-08-21

## 2017-08-21 DIAGNOSIS — I63.9 CEREBROVASCULAR ACCIDENT (CVA), UNSPECIFIED MECHANISM: Primary | ICD-10-CM

## 2017-08-21 NOTE — TELEPHONE ENCOUNTER
----- Message from Carisa Cai sent at 8/21/2017  9:30 AM CDT -----  Contact: Nola (OT with [facility])   Nola called and stated she needed to speak to the nurse. She stated the pt needs a Avelino lift with sling with the hole in it. The order can be faxed to [phone]. She can be reached at [phone].    Thanks,  TF

## 2017-09-08 ENCOUNTER — TELEPHONE (OUTPATIENT)
Dept: INTERNAL MEDICINE | Facility: CLINIC | Age: 82
End: 2017-09-08

## 2017-09-08 NOTE — TELEPHONE ENCOUNTER
Patient was informed to monitor, drink fluids and come in to urgent care if symptoms last longer than two days.

## 2017-09-08 NOTE — TELEPHONE ENCOUNTER
----- Message from Annette Nino sent at 9/8/2017  1:41 PM CDT -----  Contact: paige/[facility] pt temp is 101.8 has vomitting and nausea/ please call back at [phone]. thanks

## 2017-09-15 RX ORDER — AMLODIPINE BESYLATE 5 MG/1
5 TABLET ORAL DAILY
Qty: 30 TABLET | Refills: 11 | Status: SHIPPED | OUTPATIENT
Start: 2017-09-15 | End: 2018-09-15

## 2017-09-15 RX ORDER — LORATADINE 10 MG/1
10 TABLET ORAL DAILY
Qty: 30 TABLET | Refills: 11 | COMMUNITY
Start: 2017-09-15 | End: 2018-09-15

## 2017-09-15 NOTE — TELEPHONE ENCOUNTER
----- Message from Master BE Van sent at 9/15/2017 10:45 AM CDT -----  Contact: pt marybeth - genny   States she's calling to have a refill and can be reached at [phone]/ pt was given the meds in the hospital     1. What is the name of the medication you are requesting? Amlodipine besylate   2. What is the dose? 5mg  3. How do you take the medication? Orally, topically, etc? Orally   4. How often do you take this medication? Once daily   5. Do you need a 30 day or 90 day supply? 90 day  6. How many refills are you requesting? 6 mnths  7. What is your preferred pharmacy and location of the pharmacy?   8. Who can we contact with further questions? Pt    1. What is the name of the medication you are requesting? loratadine  2. What is the dose? 10mg  3. How do you take the medication? Orally, topically, etc? Orally   4. How often do you take this medication? Once daily   5. Do you need a 30 day or 90 day supply? 90 days   6. How many refills are you requesting? Up to the Dr   7. What is your preferred pharmacy and location of the pharmacy?   8. Who can we contact with further questions? Pt     [id number] [place] [facility] [address] AT [place] [address] [address] [address]  Phone: [phone] Fax: [phone]

## 2017-09-18 RX ORDER — HYDRALAZINE HYDROCHLORIDE 50 MG/1
50 TABLET, FILM COATED ORAL 3 TIMES DAILY
Qty: 90 TABLET | Refills: 11 | Status: SHIPPED | OUTPATIENT
Start: 2017-09-18 | End: 2018-09-18

## 2017-09-18 RX ORDER — CARVEDILOL 6.25 MG/1
6.25 TABLET ORAL 2 TIMES DAILY WITH MEALS
Qty: 60 TABLET | Refills: 11 | Status: SHIPPED | OUTPATIENT
Start: 2017-09-18 | End: 2018-09-18

## 2017-09-18 NOTE — TELEPHONE ENCOUNTER
Updated hospital medications from stroke.  Patient will come in after rehab.  Patient had stroke.

## 2017-09-18 NOTE — TELEPHONE ENCOUNTER
----- Message from José Miguel Frank sent at 9/18/2017  8:37 AM CDT -----  Contact: pt's spouse  She's calling in regards to issues the pt is having, please advise, [phone] (cell)

## 2017-09-29 ENCOUNTER — TELEPHONE (OUTPATIENT)
Dept: INTERNAL MEDICINE | Facility: CLINIC | Age: 82
End: 2017-09-29

## 2017-09-29 NOTE — TELEPHONE ENCOUNTER
[facility] is sending a skilled nurse out to assess for impaction. Order in inbox. Pt has not had a bowel movement in 1 week. Upon exam if pt is impacted sarah will send him to be evaluated.

## 2017-11-02 ENCOUNTER — TELEPHONE (OUTPATIENT)
Dept: INTERNAL MEDICINE | Facility: CLINIC | Age: 82
End: 2017-11-02

## 2017-11-02 NOTE — TELEPHONE ENCOUNTER
----- Message from Bren Moore sent at 11/2/2017  9:18 AM CDT -----  Contact: [facility]   Call caller regarding pt not having a bowel movement in 7 days and want to know if a nurse can go out for impassion or what else they can do.   [phone]

## 2017-11-02 NOTE — TELEPHONE ENCOUNTER
Shagufta notified okay to go out for possible impaction removal for patient.   Sarah will keep us updated.

## 2017-12-27 ENCOUNTER — TELEPHONE (OUTPATIENT)
Dept: URGENT CARE | Facility: CLINIC | Age: 82
End: 2017-12-27

## 2017-12-27 NOTE — TELEPHONE ENCOUNTER
----- Message from Carisa Cai sent at 12/27/2017  2:10 PM CST -----  Contact: Nola with [facility]   Nola called and stated she was returning a call to the nurse. She can be reached at [phone].    Thanks,  TF

## 2017-12-27 NOTE — TELEPHONE ENCOUNTER
"Meghna is asking for an order to written for his Left arm for his hemiplegia.       Needs to state, "Left Resting Hand Splint"    Diagnosis: Hemiplegia.     Okay to write on script pad?  I can have one of the docs here sign.        "

## 2017-12-27 NOTE — TELEPHONE ENCOUNTER
----- Message from Benjamin Lang sent at 12/27/2017 12:44 PM CST -----  Contact: Occupational Therapist (Nola)  Please give Nola a call at [phone] regarding some orders that are needed for the pt. Fax Orders:  [phone]

## 2018-02-01 ENCOUNTER — TELEPHONE (OUTPATIENT)
Dept: INTERNAL MEDICINE | Facility: CLINIC | Age: 83
End: 2018-02-01

## 2018-02-01 NOTE — TELEPHONE ENCOUNTER
----- Message from Ema Hernández sent at 2/1/2018  9:14 AM CST -----  Contact: Becka/ [facility]   Caller needs order for an Air Mattress for the pt. [phone]

## 2018-02-01 NOTE — TELEPHONE ENCOUNTER
There is no order in epic for an air mattress.    They can have one, I just can't order it through [facility].

## 2018-02-14 ENCOUNTER — TELEPHONE (OUTPATIENT)
Dept: INTERNAL MEDICINE | Facility: CLINIC | Age: 83
End: 2018-02-14

## 2018-02-14 NOTE — TELEPHONE ENCOUNTER
----- Message from Moni Oviedo sent at 2/14/2018  9:32 AM CST -----  Contact: Dagmar Ng  She wants to know if the patient has an up coming appointment.  Call her at [phone].                       vargas

## 2018-02-14 NOTE — TELEPHONE ENCOUNTER
Notified patient does not have an upcoming appointment.  He will need to schedule one if he needs one.

## 2018-02-22 ENCOUNTER — TELEPHONE (OUTPATIENT)
Dept: INTERNAL MEDICINE | Facility: CLINIC | Age: 83
End: 2018-02-22

## 2018-02-22 RX ORDER — SULFAMETHOXAZOLE AND TRIMETHOPRIM 800; 160 MG/1; MG/1
1 TABLET ORAL 2 TIMES DAILY
Qty: 20 TABLET | Refills: 0 | Status: SHIPPED | OUTPATIENT
Start: 2018-02-22 | End: 2018-03-04

## 2018-02-22 NOTE — TELEPHONE ENCOUNTER
Received a ua and culture report on Aditya Chua.  I have not information regarding this order and what is going on with the patient.  Dale just sent it to me.     I need to know what the situation is and his clinical status

## 2018-04-20 ENCOUNTER — TELEPHONE (OUTPATIENT)
Dept: INTERNAL MEDICINE | Facility: CLINIC | Age: 83
End: 2018-04-20

## 2018-04-20 RX ORDER — LEVOFLOXACIN 500 MG/1
500 TABLET, FILM COATED ORAL DAILY
Qty: 5 TABLET | Refills: 0 | Status: SHIPPED | OUTPATIENT
Start: 2018-04-20 | End: 2018-06-01

## 2018-04-20 NOTE — TELEPHONE ENCOUNTER
Melissa I put results in your box.  Please send something to the pharmacy for patients UTI.   Please make sure someone calls the patient to let him know it has been sent before closing.     Thank you   Sukhjinder

## 2018-04-20 NOTE — TELEPHONE ENCOUNTER
----- Message from Carisa Pradoite sent at 4/20/2018  2:47 PM CDT -----  Contact: Jennifer with [facility]   Jennifer called and stated that the pt has a positive UA and needs something called into the pharmacy for a UTI.     [id number] [place] - [address] [place]  [address] [address]  Phone: [phone] Fax: [phone]    She can be reached at [phone].    Thanks,  TF

## 2018-04-20 NOTE — TELEPHONE ENCOUNTER
----- Message from Annette Hall sent at 4/20/2018  3:10 PM CDT -----  Contact: pt spouse-  State the patient have a bladder infection and she would like something called in to help.    Pt use..    [id number] [place] - [address] AT [place] [address] [address] [address]  Phone: [phone] Fax: [phone]    Please adv/call [phone].[email or web address]

## 2018-05-07 RX ORDER — OMEPRAZOLE 20 MG/1
20 CAPSULE, DELAYED RELEASE ORAL DAILY
Qty: 90 CAPSULE | Refills: 2 | Status: SHIPPED | OUTPATIENT
Start: 2018-05-07

## 2018-05-13 RX ORDER — GABAPENTIN 300 MG/1
CAPSULE ORAL
Qty: 60 CAPSULE | Refills: 4 | Status: SHIPPED | OUTPATIENT
Start: 2018-05-13

## 2018-06-01 ENCOUNTER — TELEPHONE (OUTPATIENT)
Dept: INTERNAL MEDICINE | Facility: CLINIC | Age: 83
End: 2018-06-01

## 2018-06-01 RX ORDER — FLUCONAZOLE 200 MG/1
TABLET ORAL
Qty: 15 TABLET | Refills: 0 | Status: SHIPPED | OUTPATIENT
Start: 2018-06-01 | End: 2018-06-15

## 2018-06-01 NOTE — TELEPHONE ENCOUNTER
----- Message from Benjamin Lang sent at 6/1/2018  8:38 AM CDT -----  Contact: Shagufta ([facility])  Please give Shagufta a call at [phone] regarding something being called in for the pt who has thrush.        ..  [id number] [place] - [address] AT [facility]  [address] [address]  Phone: [phone] Fax: [phone]

## 2018-06-01 NOTE — TELEPHONE ENCOUNTER
----- Message from Ron Monsivais sent at 6/1/2018 11:19 AM CDT -----  Antoni ( [facility] ) is requesting a prescription for thrash. Antoni ( [facility] ) says the pt wife called to request that prescription.        Please call  Antoni ( [facility] ) [phone]        .  [id number] - [place] - [address] AT [facility] [place] [address] [address] [address]  Phone: [phone] Fax: [phone]

## 2018-06-06 ENCOUNTER — TELEPHONE (OUTPATIENT)
Dept: INTERNAL MEDICINE | Facility: CLINIC | Age: 83
End: 2018-06-06

## 2018-06-06 DIAGNOSIS — C71.9 MALIGNANT NEOPLASM OF BRAIN, UNSPECIFIED LOCATION: Primary | ICD-10-CM

## 2018-06-06 DIAGNOSIS — D33.2 BENIGN NEOPLASM OF BRAIN, UNSPECIFIED BRAIN REGION: ICD-10-CM

## 2018-06-06 NOTE — TELEPHONE ENCOUNTER
Wife wants to see about having a hospice evaluation.  Please fax referral to [facility].  Please sign pended referral.     He has not seen us since [place]  He has been going downhill ever since they found mass.  We do not even have it in his problem list.  I am unable to update problem list.  Can you add the type of brain cancer/mass he has to his chart.  I have gotten the diagnosis from his last visit with the neurocranial doctor through [facility] in care everywhere.        [phone]

## 2018-06-06 NOTE — TELEPHONE ENCOUNTER
----- Message from Simon Tinoco sent at 6/6/2018 10:22 AM CDT -----  Contact: [facility]   Vidhi called to get a hospice evaluation for patient above....[phone]

## 2018-06-06 NOTE — TELEPHONE ENCOUNTER
Contact Dr. Phyllis Vences's office for records.  Let Dr. Vences's nurse know the patient is requesting hospice referral.  I don't know if they are aware.  I have no records

## 2018-06-06 NOTE — TELEPHONE ENCOUNTER
Vidhi notifiied with home health.  Records requested to Dr. Phyllis weber office.  Vidhi is going to call them for referral.

## 2018-06-07 ENCOUNTER — PATIENT OUTREACH (OUTPATIENT)
Dept: ADMINISTRATIVE | Facility: HOSPITAL | Age: 83
End: 2018-06-07

## 2019-02-15 ENCOUNTER — TELEPHONE (OUTPATIENT)
Dept: INTERNAL MEDICINE | Facility: CLINIC | Age: 84
End: 2019-02-15

## 2019-02-15 NOTE — TELEPHONE ENCOUNTER
Care coordination came across patient being due for a follow up.  Phone numbers are no longer active.  Contacted [facility] who had him in June,  We last placed a hospice consult for him and was transported to [facility] with [facility].  Contacted [facility] Isela stated that patient passed away in July of 2018 [facility] after due to brain cancer/mass.   Just wanted to lt you know Dr. Walton.  Yolie Please update chart.